# Patient Record
(demographics unavailable — no encounter records)

---

## 2019-12-12 NOTE — PHYS DOC
Past Medical History


Past Medical History:  No Pertinent History


Past Surgical History:  No Surgical History


Alcohol Use:  None


Drug Use:  None





Adult General


Chief Complaint


Chief Complaint:  ABDOMINAL PAIN





HPI


HPI





Patient is a 53  year old Female with no significant medical history who 

presents to the ED today complaining of 8 out of 10 left flank pain radiating to

the left lower quadrant that began 2-3 days ago. She describes the pain as sharp

and intermittent, denies any exacerbating or relieving factors. Denies any 

nausea vomiting. Denies any personal history of kidney stones. Denies any 

hematuria.





Review of Systems


Review of Systems





Constitutional: Denies fever or chills []


Eyes: Denies change in visual acuity, redness, or eye pain []


HENT: Denies nasal congestion or sore throat []


Respiratory: Denies cough or shortness of breath []


Cardiovascular: No additional information not addressed in HPI []


GI: reports left lower quadrant abdominal pain, deniesnausea, vomiting, bloody 

stools or diarrhea []


: Reports left flank pain.Denies dysuria or hematuria []


Musculoskeletal: Denies back pain or joint pain []


Integument: Denies rash or skin lesions []


Neurologic: Denies headache, focal weakness or sensory changes []








All other systems were reviewed and found to be within normal limits, except as 

documented in this note.





Current Medications


Current Medications





Current Medications








 Medications


  (Trade)  Dose


 Ordered  Sig/Ayaz  Start Time


 Stop Time Status Last Admin


Dose Admin


 


 Ceftriaxone Sodium


  (Rocephin)  1 gm  1X  ONCE  12/12/19 21:00


 12/12/19 21:01 DC 12/12/19 21:56


1 GM


 


 Morphine Sulfate


  (Morphine


 Sulfate)  2 mg  1X  ONCE  12/12/19 20:15


 12/12/19 20:19 DC 12/12/19 20:31


2 MG


 


 Ondansetron HCl


  (Zofran)  4 mg  1X  ONCE  12/12/19 20:15


 12/12/19 20:19 DC 12/12/19 20:31


4 MG


 


 Sodium Chloride  1,000 ml @ 


 1,000 mls/hr  1X  ONCE  12/12/19 20:15


 12/12/19 21:14 DC 12/12/19 20:31


1,000 MLS/HR











Allergies


Allergies





Allergies








Coded Allergies Type Severity Reaction Last Updated Verified


 


  No Known Drug Allergies    12/12/19 No











Physical Exam


Physical Exam





Constitutional: Well developed, well nourished, no acute distress, non-toxic 

appearance. []


HENT: Normocephalic, atraumatic, bilateral external ears normal, oropharynx 

moist, no oral exudates, nose normal. []


Eyes: PERRLA, EOMI, conjunctiva normal, no discharge. [] 


Neck: Normal range of motion, no tenderness, supple, no stridor. [] 


Cardiovascular:Heart rate regular rhythm, no murmur []


Lungs & Thorax:  Bilateral breath sounds clear to auscultation []


Abdomen: Bowel sounds normal, soft, no tenderness, no masses, no pulsatile 

masses. [] 


Skin: Warm, dry, no erythema, no rash. [] 


Back: No tenderness,mild left CVA tenderness. [] 


Extremities: No tenderness, no cyanosis, no clubbing, ROM intact, no edema. [] 


Neurologic: Alert and oriented X 3, normal motor function, normal sensory 

function, no focal deficits noted. []


Psychologic: Affect normal, judgement normal, mood normal. []





Current Patient Data


Vital Signs





                                   Vital Signs








  Date Time  Temp Pulse Resp B/P (MAP) Pulse Ox O2 Delivery O2 Flow Rate FiO2


 


12/12/19 20:31   16  98 Room Air  


 


12/12/19 19:52 97.7 73  138/76 (96)    





 97.7       








Lab Values





                                Laboratory Tests








Test


 12/12/19


20:00 12/12/19


20:15 12/12/19


21:55


 


Urine Collection Type Unknown    


 


Urine Color Yellow    


 


Urine Clarity Clear    


 


Urine pH 6.5    


 


Urine Specific Gravity 1.010    


 


Urine Protein


 Negative mg/dL


(NEG-TRACE) 


 





 


Urine Glucose (UA)


 Negative mg/dL


(NEG) 


 





 


Urine Ketones (Stick)


 Negative mg/dL


(NEG) 


 





 


Urine Blood Small (NEG)    


 


Urine Nitrite


 Negative (NEG)


 


 





 


Urine Bilirubin


 Negative (NEG)


 


 





 


Urine Urobilinogen Dipstick


 0.2 mg/dL (0.2


mg/dL) 


 





 


Urine Leukocyte Esterase


 Moderate (NEG)


 


 





 


Urine RBC 0 /HPF (0-2)    


 


Urine WBC


 1-4 /HPF (0-4)


 


 





 


Urine Squamous Epithelial


Cells Few /LPF  


 


 





 


Urine Bacteria


 Few /HPF


(0-FEW) 


 





 


Urine Opiates Screen Neg (NEG)    


 


Urine Methadone Screen Neg (NEG)    


 


Urine Barbiturates Neg (NEG)    


 


Urine Phencyclidine Screen Neg (NEG)    


 


Urine


Amphetamine/Methamphetamine Neg (NEG)  


 


 





 


Urine Benzodiazepines Screen Neg (NEG)    


 


Urine Cocaine Screen Neg (NEG)    


 


Urine Cannabinoids Screen Neg (NEG)    


 


Urine Ethyl Alcohol Neg (NEG)    


 


White Blood Count


 


 10.3 x10^3/uL


(4.0-11.0) 





 


Red Blood Count


 


 4.94 x10^6/uL


(3.50-5.40) 





 


Hemoglobin


 


 12.7 g/dL


(12.0-15.5) 





 


Hematocrit


 


 38.4 %


(36.0-47.0) 





 


Mean Corpuscular Volume


 


 78 fL ()


L 





 


Mean Corpuscular Hemoglobin  26 pg (25-35)   


 


Mean Corpuscular Hemoglobin


Concent 


 33 g/dL


(31-37) 





 


Red Cell Distribution Width


 


 14.6 %


(11.5-14.5)  H 





 


Platelet Count


 


 344 x10^3/uL


(140-400) 





 


Neutrophils (%) (Auto)  47 % (31-73)   


 


Lymphocytes (%) (Auto)  39 % (24-48)   


 


Monocytes (%) (Auto)  9 % (0-9)   


 


Eosinophils (%) (Auto)  3 % (0-3)   


 


Basophils (%) (Auto)  1 % (0-3)   


 


Neutrophils # (Auto)


 


 4.8 x10^3/uL


(1.8-7.7) 





 


Lymphocytes # (Auto)


 


 4.0 x10^3/uL


(1.0-4.8) 





 


Monocytes # (Auto)


 


 0.9 x10^3/uL


(0.0-1.1) 





 


Eosinophils # (Auto)


 


 0.3 x10^3/uL


(0.0-0.7) 





 


Basophils # (Auto)


 


 0.1 x10^3/uL


(0.0-0.2) 





 


Sodium Level


 


 


 140 mmol/L


(136-145)


 


Potassium Level


 


 


 4.1 mmol/L


(3.5-5.1)


 


Chloride Level


 


 


 106 mmol/L


()


 


Carbon Dioxide Level


 


 


 23 mmol/L


(21-32)


 


Anion Gap   11 (6-14)  


 


Blood Urea Nitrogen


 


 


 13 mg/dL


(7-20)


 


Creatinine


 


 


 0.6 mg/dL


(0.6-1.0)


 


Estimated GFR


(Cockcroft-Gault) 


 


 104.6  





 


BUN/Creatinine Ratio   22 (6-20)  H


 


Glucose Level


 


 


 93 mg/dL


(70-99)


 


Calcium Level


 


 


 8.6 mg/dL


(8.5-10.1)


 


Total Bilirubin


 


 


 0.2 mg/dL


(0.2-1.0)


 


Aspartate Amino Transferase


(AST) 


 


 16 U/L (15-37)





 


Alanine Aminotransferase (ALT)


 


 


 25 U/L (14-59)





 


Alkaline Phosphatase


 


 


 86 U/L


()


 


Total Protein


 


 


 7.6 g/dL


(6.4-8.2)


 


Albumin


 


 


 3.5 g/dL


(3.4-5.0)


 


Albumin/Globulin Ratio


 


 


 0.9 (1.0-1.7)


L


 


Lipase


 


 


 151 U/L


()


 


Ethyl Alcohol Level


 


 


 < 10 mg/dL


(0-10)





                                Laboratory Tests


12/12/19 20:15








                                Laboratory Tests


12/12/19 21:55














EKG


EKG


[]





Radiology/Procedures


Radiology/Procedures


[]





Course & Med Decision Making


Course & Med Decision Making


Pertinent Labs and Imaging studies reviewed. (See chart for details)





This is a 53-year-old female patient presenting to the ED today with left flank 

pain radiating to the left lower quadrant for 2-3 days. CBC with a normal WBC, 

CMP with no acute findings, urine analysis is noted for moderate amount of 

leukocytes. Patient was discharged on cephalexin, given Rocephin in the ED. 

Instructed to push fluids. Follow-up with PCP in 1-2 weeks.





Dragon Disclaimer


Dragon Disclaimer


This electronic medical record was generated, in whole or in part, using a voice

 recognition dictation system.





Departure


Departure


Impression:  


   Primary Impression:  


   Urinary tract infection


Disposition:  01 HOME, SELF-CARE


Condition:  STABLE


Referrals:  


UNKNOWN PCP NAME (PCP)


follow up in one week with your doctor


Patient Instructions:  Urinary Tract Infection





Additional Instructions:  


You were evaluated in the emergency room and noted to have infection in your 

urine. We'll put you on antibiotics, ensure you complete them. Please take 

Tylenol/Motrin for pain or fever, push fluids. Follow-up with your own doctor in

 the course of this week or next week.


Scripts


Cephalexin (CEPHALEXIN) 500 Mg Tablet


1 TAB PO BID, #14 TAB


   Prov: KRISTEL CARTY         12/12/19





Problem Qualifiers








   Primary Impression:  


   Urinary tract infection


   Urinary tract infection type:  site unspecified  Hematuria presence:  without

    hematuria  Qualified Codes:  N39.0 - Urinary tract infection, site not 

   specified








KRISTEL CARTY              Dec 12, 2019 22:55